# Patient Record
Sex: FEMALE | Race: WHITE | NOT HISPANIC OR LATINO | Employment: OTHER | URBAN - NONMETROPOLITAN AREA
[De-identification: names, ages, dates, MRNs, and addresses within clinical notes are randomized per-mention and may not be internally consistent; named-entity substitution may affect disease eponyms.]

---

## 2023-04-24 ENCOUNTER — OFFICE VISIT (OUTPATIENT)
Dept: URGENT CARE | Facility: CLINIC | Age: 68
End: 2023-04-24

## 2023-04-24 VITALS
HEART RATE: 78 BPM | TEMPERATURE: 98 F | DIASTOLIC BLOOD PRESSURE: 66 MMHG | OXYGEN SATURATION: 98 % | SYSTOLIC BLOOD PRESSURE: 120 MMHG | RESPIRATION RATE: 17 BRPM

## 2023-04-24 DIAGNOSIS — R42 VERTIGO: Primary | ICD-10-CM

## 2023-04-24 DIAGNOSIS — R11.0 NAUSEA: ICD-10-CM

## 2023-04-24 DIAGNOSIS — H65.193 ACUTE EFFUSION OF BOTH MIDDLE EARS: ICD-10-CM

## 2023-04-24 RX ORDER — CYCLOBENZAPRINE HCL 10 MG
TABLET ORAL
COMMUNITY
Start: 2023-03-13

## 2023-04-24 RX ORDER — ONDANSETRON 4 MG/1
4 TABLET, FILM COATED ORAL EVERY 8 HOURS PRN
Qty: 20 TABLET | Refills: 0 | Status: SHIPPED | OUTPATIENT
Start: 2023-04-24

## 2023-04-24 RX ORDER — LEVOTHYROXINE SODIUM 88 UG/1
88 TABLET ORAL
COMMUNITY

## 2023-04-24 RX ORDER — PREGABALIN 200 MG/1
200 CAPSULE ORAL
COMMUNITY

## 2023-04-24 RX ORDER — CITALOPRAM 40 MG/1
TABLET ORAL
COMMUNITY
Start: 2023-04-10

## 2023-04-24 RX ORDER — MECLIZINE HYDROCHLORIDE 25 MG/1
25 TABLET ORAL 3 TIMES DAILY PRN
Qty: 30 TABLET | Refills: 0 | Status: SHIPPED | OUTPATIENT
Start: 2023-04-24

## 2023-04-24 RX ORDER — FAMOTIDINE 20 MG/1
TABLET, FILM COATED ORAL
COMMUNITY
Start: 2023-03-13

## 2023-04-24 NOTE — PATIENT INSTRUCTIONS
Prescription medication as instructed  Perform Valsalva maneuver to help promote drainage of the ears  Small sips of fluids every few minutes throughout the day  Daily antihistamine such as Zyrtec, Allegra, or Claritin  It make sure to follow-up with your family doctor  Go to the emergency room if symptoms worsen or if you develop any sudden symptoms  every fewFollow up with PCP in 3-5 days  Proceed to  ER if symptoms worsen  Vertigo   WHAT YOU NEED TO KNOW:   Vertigo is a condition that causes you to feel dizzy  You may feel that you or everything around you is moving or spinning  You may also feel like you are being pulled down or toward your side  DISCHARGE INSTRUCTIONS:   Return to the emergency department if:   You have a headache and a stiff neck  You have shaking chills and a fever  You vomit over and over with no relief  You have blood, pus, or fluid coming out of your ears  You are confused  Contact your healthcare provider if:   Your symptoms do not get better with treatment  You have questions about your condition or care  Medicines:   Medicine  may be given to help relieve your symptoms  Take your medicine as directed  Contact your healthcare provider if you think your medicine is not helping or if you have side effects  Tell your provider if you are allergic to any medicine  Keep a list of the medicines, vitamins, and herbs you take  Include the amounts, and when and why you take them  Bring the list or the pill bottles to follow-up visits  Carry your medicine list with you in case of an emergency  Manage your symptoms:   Do not drive , walk without help, or operate heavy machinery when you are dizzy  Move slowly  when you move from one position to another position  Get up slowly from sitting or lying down  Sit or lie down right away if you feel dizzy  Drink plenty of liquids  Liquids help prevent dehydration   Ask how much liquid to drink each day and which liquids are best for you  Vestibular and balance rehabilitation therapy (VBRT)  is used to teach you exercises to improve your balance and strength  These exercises may help decrease your vertigo and improve your balance  Ask for more information about this therapy  Follow up with your doctor as directed:  Write down your questions so you remember to ask them during your visits  © Copyright [employer] 2022 Information is for End User's use only and may not be sold, redistributed or otherwise used for commercial purposes  The above information is an  only  It is not intended as medical advice for individual conditions or treatments  Talk to your doctor, nurse or pharmacist before following any medical regimen to see if it is safe and effective for you  Fluid In The Ear (Serous Otitis Media)   WHAT YOU NEED TO KNOW:   NICANOR is fluid trapped in the middle of your ear behind your eardrum  This condition usually develops without signs or symptoms of an ear infection  Serous otitis media may be caused by an upper respiratory infection or allergies  It is most common in the fall and early spring  DISCHARGE INSTRUCTIONS:   Call your doctor if:   You develop severe ear pain  The outside of your ear is red or swollen  You have fluid coming from your ear  You have questions or concerns about your condition or care  How to stay healthy:   Wash your hands often throughout the day  Use soap and water  Rub your soapy hands together, lacing your fingers, for at least 20 seconds  Rinse with warm, running water  Dry your hands with a clean towel or paper towel  Use hand  that contains alcohol if soap and water are not available  Teach children how to wash their hands and use hand   Avoid people who are sick  Some germs are easily and quickly spread through contact      Follow up with your doctor as directed:  Write down your questions so you remember to ask them during your visits  © Copyright Raghavendra Bernard 2022 Information is for End User's use only and may not be sold, redistributed or otherwise used for commercial purposes  The above information is an  only  It is not intended as medical advice for individual conditions or treatments  Talk to your doctor, nurse or pharmacist before following any medical regimen to see if it is safe and effective for you

## 2023-04-24 NOTE — PROGRESS NOTES
[id number]        NAME: Marce Salcedo is a 69 y o  female  : 1955    MRN: [id number]  DATE: 2023  TIME: 3:07 PM    Assessment and Plan   Vertigo [R42]  1  Vertigo  meclizine (ANTIVERT) 25 mg tablet      2  Nausea  ondansetron (ZOFRAN) 4 mg tablet      3  Acute effusion of both middle ears          Bilateral ear effusion without erythema  Patient having symptoms of vertigo and nausea with dry heaves  Denies any recent illnesses  Denies any ear symptoms  Sending in meclizine and nausea  Advised to follow-up with her family doctor  Advised to go to the ER if symptoms persist or if suddenly worsen  Patient Instructions     Prescription medication as instructed  Perform Valsalva maneuver to help promote drainage of the ears  Small sips of fluids every few minutes throughout the day  Daily antihistamine such as Zyrtec, Allegra, or Claritin  It make sure to follow-up with your family doctor  Go to the emergency room if symptoms worsen or if you develop any sudden symptoms  every fewFollow up with PCP in 3-5 days  Proceed to  ER if symptoms worsen  Chief Complaint     Chief Complaint   Patient presents with   • Dizziness     Started 4 days ago  Denies falling       • Nausea         History of Present Illness       77-year-old female presents with 4 days of intermittent dizziness, lightheadedness, and nausea with dry heaving  Denies any recent illnesses or sick contacts  Has not tried any over-the-counter medications  Has limited appetite  PT has been trying to drink fluids  Denies any fever, chills, ear pain, ear drainage, sore throat, chest pain, shortness of breath, abdominal pain, vomiting, diarrhea, constipation  Review of Systems   Review of Systems   Constitutional: Negative  HENT: Negative  Eyes: Negative  Respiratory: Negative  Cardiovascular: Negative  Gastrointestinal: Positive for nausea   Negative for abdominal distention, abdominal pain, blood in stool, constipation, diarrhea and vomiting  Musculoskeletal: Negative  Skin: Negative  Neurological: Positive for dizziness and light-headedness  Negative for seizures, syncope, facial asymmetry, speech difficulty, weakness, numbness and headaches  Psychiatric/Behavioral: Negative            Current Medications       Current Outpatient Medications:   •  citalopram (CeleXA) 40 mg tablet, , Disp: , Rfl:   •  cyclobenzaprine (FLEXERIL) 10 mg tablet, , Disp: , Rfl:   •  famotidine (PEPCID) 20 mg tablet, , Disp: , Rfl:   •  levothyroxine 88 mcg tablet, Take 88 mcg by mouth, Disp: , Rfl:   •  meclizine (ANTIVERT) 25 mg tablet, Take 1 tablet (25 mg total) by mouth 3 (three) times a day as needed for dizziness, Disp: 30 tablet, Rfl: 0  •  ondansetron (ZOFRAN) 4 mg tablet, Take 1 tablet (4 mg total) by mouth every 8 (eight) hours as needed for nausea or vomiting, Disp: 20 tablet, Rfl: 0  •  pregabalin (LYRICA) 200 MG capsule, Take 200 mg by mouth, Disp: , Rfl:     Current Allergies     Allergies as of 2023 - Reviewed 2023   Allergen Reaction Noted   • Codeine Shortness Of Breath and Other (See Comments) 2015   • Acetaminophen Other (See Comments) 2015   • Hydrocodone Other (See Comments) 2015   • Morphine Other (See Comments) 2015   • Propoxyphene Other (See Comments) 2015            The following portions of the patient's history were reviewed and updated as appropriate: allergies, current medications, past family history, past medical history, past social history, past surgical history and problem list      Past Medical History:   Diagnosis Date   • Fibromyalgia    • GERD (gastroesophageal reflux disease)    • Hypothyroidism        Past Surgical History:   Procedure Laterality Date   • APPENDECTOMY     •  SECTION     • CHOLECYSTECTOMY     • HAND SURGERY Left    • KNEE CARTILAGE SURGERY Bilateral    • PARTIAL HYSTERECTOMY         No family history on file       Medications have been verified  Objective   /66   Pulse 78   Temp 98 °F (36 7 °C)   Resp 17   SpO2 98%        Physical Exam     Physical Exam  Constitutional:       General: She is not in acute distress  Appearance: Normal appearance  She is not ill-appearing  HENT:      Head: Normocephalic and atraumatic  Right Ear: Ear canal and external ear normal  No drainage, swelling or tenderness  A middle ear effusion is present  Tympanic membrane is not perforated or erythematous  Left Ear: Ear canal and external ear normal  No drainage, swelling or tenderness  A middle ear effusion is present  Tympanic membrane is not perforated or erythematous  Nose: Nose normal       Mouth/Throat:      Mouth: Mucous membranes are moist       Pharynx: Oropharynx is clear  Eyes:      Extraocular Movements: Extraocular movements intact  Conjunctiva/sclera: Conjunctivae normal       Pupils: Pupils are equal, round, and reactive to light  Cardiovascular:      Rate and Rhythm: Normal rate and regular rhythm  Pulses: Normal pulses  Heart sounds: Normal heart sounds  No murmur heard  No friction rub  No gallop  Pulmonary:      Effort: Pulmonary effort is normal  No respiratory distress  Breath sounds: Normal breath sounds  No stridor  No wheezing, rhonchi or rales  Chest:      Chest wall: No tenderness  Abdominal:      General: Abdomen is flat  Bowel sounds are normal       Palpations: Abdomen is soft  Tenderness: There is no abdominal tenderness  There is no guarding or rebound  Musculoskeletal:      Cervical back: Normal range of motion and neck supple  Skin:     General: Skin is warm and dry  Capillary Refill: Capillary refill takes less than 2 seconds  Findings: No rash  Neurological:      General: No focal deficit present  Mental Status: She is alert and oriented to person, place, and time  Mental status is at baseline        GCS: GCS eye subscore is 4  GCS verbal subscore is 5  GCS motor subscore is 6  Cranial Nerves: Cranial nerves 2-12 are intact  No cranial nerve deficit  Sensory: Sensation is intact  No sensory deficit  Motor: Motor function is intact  No weakness  Coordination: Coordination is intact  Coordination normal       Gait: Gait is intact  Gait normal       Deep Tendon Reflexes: Reflexes are normal and symmetric  Reflexes normal    Psychiatric:         Mood and Affect: Mood normal          Behavior: Behavior normal          Thought Content:  Thought content normal          Judgment: Judgment normal